# Patient Record
(demographics unavailable — no encounter records)

---

## 2025-01-30 NOTE — HISTORY OF PRESENT ILLNESS
[FreeTextEntry1] :  Patient here for periodic assessment and blood work. [de-identified] :  Patient here for periodic assessment and blood work.

## 2025-05-15 NOTE — HISTORY OF PRESENT ILLNESS
[FreeTextEntry1] :  Patient here for periodic assessment and blood work. [de-identified] :  Patient here for periodic assessment and blood work.

## 2025-05-29 NOTE — HISTORY OF PRESENT ILLNESS
[FreeTextEntry1] : Encounter to discuss test results [de-identified] : Encounter to discuss test results

## 2025-05-29 NOTE — HISTORY OF PRESENT ILLNESS
[FreeTextEntry1] : Encounter to discuss test results [de-identified] : Encounter to discuss test results